# Patient Record
Sex: MALE | Race: WHITE
[De-identification: names, ages, dates, MRNs, and addresses within clinical notes are randomized per-mention and may not be internally consistent; named-entity substitution may affect disease eponyms.]

---

## 2021-11-29 ENCOUNTER — HOSPITAL ENCOUNTER (OUTPATIENT)
Dept: HOSPITAL 63 - PMG | Age: 46
End: 2021-11-29
Attending: NURSE PRACTITIONER
Payer: COMMERCIAL

## 2021-11-29 DIAGNOSIS — S22.32XA: Primary | ICD-10-CM
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
## 2021-11-29 PROCEDURE — 71101 X-RAY EXAM UNILAT RIBS/CHEST: CPT

## 2021-11-29 NOTE — RAD
XR RIBS MIN 3 VIEWS LT W/PA CHEST



History: Fall, left-sided chest/rib pain.



Comparison: None.



Technique: PA chest with 3 views of the left ribs.



Findings:

The lungs are adequately and symmectrically inflated. No airspace consolidation, pleural effusion or 
pneumothorax. The cardiomediastinal silhoutte and pulmonary vasculature are within normal limits. Chr
onic appearing fracture deformities at the left fourth, fifth, sixth and seventh ribs posterior later
ally. A pain marker projects at the anterior eighth and ninth ribs. There is a subtle cortical displa
cement at the distal left 10th rib. Soft tissues are unremarkable.



Impression: 

1.  Mildly displaced left anterior 10th rib fracture. No pleural effusion or pneumothorax.



Electronically signed by: Marcelino Walls MD (11/29/2021 2:29 PM) [facility]